# Patient Record
Sex: FEMALE | Race: WHITE | NOT HISPANIC OR LATINO | Employment: STUDENT | URBAN - METROPOLITAN AREA
[De-identification: names, ages, dates, MRNs, and addresses within clinical notes are randomized per-mention and may not be internally consistent; named-entity substitution may affect disease eponyms.]

---

## 2018-06-12 ENCOUNTER — OFFICE VISIT (OUTPATIENT)
Dept: FAMILY MEDICINE | Facility: CLINIC | Age: 23
End: 2018-06-12
Payer: COMMERCIAL

## 2018-06-12 ENCOUNTER — LAB VISIT (OUTPATIENT)
Dept: LAB | Facility: HOSPITAL | Age: 23
End: 2018-06-12
Attending: FAMILY MEDICINE
Payer: COMMERCIAL

## 2018-06-12 VITALS
DIASTOLIC BLOOD PRESSURE: 62 MMHG | WEIGHT: 111.56 LBS | BODY MASS INDEX: 19.77 KG/M2 | SYSTOLIC BLOOD PRESSURE: 98 MMHG | HEIGHT: 63 IN

## 2018-06-12 DIAGNOSIS — Z00.00 WELLNESS EXAMINATION: ICD-10-CM

## 2018-06-12 DIAGNOSIS — R51.9 PERSISTENT HEADACHES: ICD-10-CM

## 2018-06-12 DIAGNOSIS — Z01.00 EYE EXAM, ROUTINE: ICD-10-CM

## 2018-06-12 DIAGNOSIS — F32.A DEPRESSION, UNSPECIFIED DEPRESSION TYPE: ICD-10-CM

## 2018-06-12 DIAGNOSIS — Z00.00 WELLNESS EXAMINATION: Primary | ICD-10-CM

## 2018-06-12 LAB
ANION GAP SERPL CALC-SCNC: 9 MMOL/L
BASOPHILS # BLD AUTO: 0.03 K/UL
BASOPHILS NFR BLD: 0.4 %
BUN SERPL-MCNC: 11 MG/DL
CALCIUM SERPL-MCNC: 9.8 MG/DL
CHLORIDE SERPL-SCNC: 104 MMOL/L
CO2 SERPL-SCNC: 25 MMOL/L
CREAT SERPL-MCNC: 0.8 MG/DL
DIFFERENTIAL METHOD: ABNORMAL
EOSINOPHIL # BLD AUTO: 0.3 K/UL
EOSINOPHIL NFR BLD: 4.1 %
ERYTHROCYTE [DISTWIDTH] IN BLOOD BY AUTOMATED COUNT: 12.8 %
EST. GFR  (AFRICAN AMERICAN): >60 ML/MIN/1.73 M^2
EST. GFR  (NON AFRICAN AMERICAN): >60 ML/MIN/1.73 M^2
GLUCOSE SERPL-MCNC: 85 MG/DL
HCT VFR BLD AUTO: 43.2 %
HGB BLD-MCNC: 13.9 G/DL
IMM GRANULOCYTES # BLD AUTO: 0.01 K/UL
IMM GRANULOCYTES NFR BLD AUTO: 0.1 %
LYMPHOCYTES # BLD AUTO: 2.1 K/UL
LYMPHOCYTES NFR BLD: 28.5 %
MCH RBC QN AUTO: 32 PG
MCHC RBC AUTO-ENTMCNC: 32.2 G/DL
MCV RBC AUTO: 99 FL
MONOCYTES # BLD AUTO: 0.5 K/UL
MONOCYTES NFR BLD: 7.2 %
NEUTROPHILS # BLD AUTO: 4.3 K/UL
NEUTROPHILS NFR BLD: 59.7 %
NRBC BLD-RTO: 0 /100 WBC
PLATELET # BLD AUTO: 258 K/UL
PMV BLD AUTO: 10.5 FL
POTASSIUM SERPL-SCNC: 4 MMOL/L
RBC # BLD AUTO: 4.35 M/UL
SODIUM SERPL-SCNC: 138 MMOL/L
WBC # BLD AUTO: 7.26 K/UL

## 2018-06-12 PROCEDURE — 99395 PREV VISIT EST AGE 18-39: CPT | Mod: S$GLB,,, | Performed by: FAMILY MEDICINE

## 2018-06-12 PROCEDURE — 36415 COLL VENOUS BLD VENIPUNCTURE: CPT | Mod: PO

## 2018-06-12 PROCEDURE — 85025 COMPLETE CBC W/AUTO DIFF WBC: CPT

## 2018-06-12 PROCEDURE — 80048 BASIC METABOLIC PNL TOTAL CA: CPT

## 2018-06-12 PROCEDURE — 99999 PR PBB SHADOW E&M-EST. PATIENT-LVL III: CPT | Mod: PBBFAC,,, | Performed by: FAMILY MEDICINE

## 2018-06-12 RX ORDER — ESCITALOPRAM OXALATE 5 MG/1
5 TABLET ORAL DAILY
Qty: 30 TABLET | Refills: 11 | Status: SHIPPED | OUTPATIENT
Start: 2018-06-12 | End: 2018-09-19 | Stop reason: ALTCHOICE

## 2018-06-12 RX ORDER — BUTALBITAL, ACETAMINOPHEN AND CAFFEINE 50; 325; 40 MG/1; MG/1; MG/1
1 TABLET ORAL EVERY 4 HOURS PRN
Qty: 60 TABLET | Refills: 1 | Status: SHIPPED | OUTPATIENT
Start: 2018-06-12 | End: 2018-07-12
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
## 2018-06-15 ENCOUNTER — PATIENT MESSAGE (OUTPATIENT)
Dept: FAMILY MEDICINE | Facility: CLINIC | Age: 23
End: 2018-06-15

## 2018-06-15 ENCOUNTER — TELEPHONE (OUTPATIENT)
Dept: FAMILY MEDICINE | Facility: CLINIC | Age: 23
End: 2018-06-15

## 2018-06-15 NOTE — TELEPHONE ENCOUNTER
----- Message from Chucho Dickson MD sent at 6/14/2018 12:14 PM CDT -----  Please inform patient that lab results are not concerning/normal. No changes to medications at this time and if there are any questions, to give us a call. Thank You,    Chucho Dickson MD  Family Medicine  6/14/2018

## 2018-06-21 ENCOUNTER — CLINICAL SUPPORT (OUTPATIENT)
Dept: FAMILY MEDICINE | Facility: CLINIC | Age: 23
End: 2018-06-21
Payer: COMMERCIAL

## 2018-06-21 DIAGNOSIS — Z23 IMMUNIZATION DUE: Primary | ICD-10-CM

## 2018-06-21 PROCEDURE — 90471 IMMUNIZATION ADMIN: CPT | Mod: S$GLB,,, | Performed by: NURSE PRACTITIONER

## 2018-06-21 PROCEDURE — 90715 TDAP VACCINE 7 YRS/> IM: CPT | Mod: S$GLB,,, | Performed by: NURSE PRACTITIONER

## 2018-06-22 NOTE — PROGRESS NOTES
Pt. Identified using  and name.Procedure was explained, pt. Verbalized understanding. Tdap administered IM in the right deltoid using sterile technique, no bleeding noted at injection site.  Pt. tolerated procedure well.

## 2018-07-02 ENCOUNTER — TELEPHONE (OUTPATIENT)
Dept: FAMILY MEDICINE | Facility: CLINIC | Age: 23
End: 2018-07-02

## 2018-07-02 NOTE — TELEPHONE ENCOUNTER
Patient will need a form signed regarding her medication in order to start school. Do you need an appointment to sign document.    Please Advise:

## 2018-07-02 NOTE — TELEPHONE ENCOUNTER
What kind of form for medication?  School physical form that includes the medication on it? I have never heard of just a form for medications for schools?

## 2018-07-02 NOTE — TELEPHONE ENCOUNTER
----- Message from Leona Russell sent at 7/2/2018 12:17 PM CDT -----  Contact: PT  Type: Needs Medical Advice    Who Called:  Lisbeth   Symptoms (please be specific):  n/a  How long has patient had these symptoms:  n/a  Pharmacy name and phone #:  n/a  Best Call Back Number:    Additional Information:  Pt has a form from school that needs to be signed from Doctor to release her to start school and also stating that he is the prescriber for her prescription. Please call back and advise if she can come in to get this signed.

## 2018-07-31 ENCOUNTER — TELEPHONE (OUTPATIENT)
Dept: FAMILY MEDICINE | Facility: CLINIC | Age: 23
End: 2018-07-31

## 2018-07-31 NOTE — TELEPHONE ENCOUNTER
Patient informed letter is located at the . PCP out. Letter has been completed by PA. Pt. Verbalized understanding.

## 2018-07-31 NOTE — TELEPHONE ENCOUNTER
----- Message from Azalea Haney LPN sent at 7/30/2018  4:53 PM CDT -----  Contact: self [phone]      ----- Message -----  From: Josseline Giron  Sent: 7/30/2018  12:14 PM  To: [facility] Staff    Patient dropped off paperwork for [employer] regarding needing physician signature on it for medication she is on.  Please sign and call patient to advise when done.  Paperwork is in your mailbox.

## 2018-08-25 ENCOUNTER — PATIENT MESSAGE (OUTPATIENT)
Dept: FAMILY MEDICINE | Facility: CLINIC | Age: 23
End: 2018-08-25

## 2018-08-27 NOTE — TELEPHONE ENCOUNTER
Called patient. She stated that she is a mom of 2 little kids, was going back to school, and her  was out of stated working for another 7 months and with her crazy schedule she had forgotten to take her lexapro 3 days in a row and that's when she started feeling down, and wanting to sleep all day. She has resumed taking the lexapro and has withdrawn from school and she has been feeling much better but she feels like her lexapro needs to be increased. Scheduled appt with Randall tomorrow.

## 2018-09-18 PROBLEM — Z12.4 SCREENING FOR MALIGNANT NEOPLASM OF CERVIX: Status: ACTIVE | Noted: 2018-09-18

## 2018-09-18 PROBLEM — Z28.9 DELAYED IMMUNIZATIONS: Status: ACTIVE | Noted: 2018-09-18

## 2018-09-18 PROBLEM — Z11.8 ENCOUNTER FOR SCREENING EXAMINATION FOR CHLAMYDIAL INFECTION: Status: ACTIVE | Noted: 2018-09-18

## 2018-09-18 NOTE — PROGRESS NOTES
Subjective:       Patient ID: Lisbeth Vazquez is a 23 y.o. female.    Chief Complaint: Follow-up (mild depression with numb feeling emotions)    Pt is moving to [place] in 2 weeks. Pt d/c lexapro.   Blurred vision regardles of HA sx      Headache    This is a recurrent problem. The current episode started more than 1 year ago. The problem has been waxing and waning. The pain is located in the parietal region. The pain quality is similar to prior headaches. The quality of the pain is described as aching and pulsating. The pain is severe. Associated symptoms include blurred vision and tinnitus. Pertinent negatives include no hearing loss, neck pain, phonophobia, photophobia, rhinorrhea, vomiting or weakness. Exacerbated by: menstrual cycles, stress. Treatments tried: fioricet.     Review of Systems   Constitutional: Negative for activity change and unexpected weight change.   HENT: Positive for tinnitus. Negative for hearing loss, rhinorrhea and trouble swallowing.    Eyes: Positive for blurred vision. Negative for photophobia, discharge and visual disturbance.   Respiratory: Negative for chest tightness and wheezing.    Gastrointestinal: Negative for blood in stool, constipation, diarrhea and vomiting.   Endocrine: Negative for polydipsia and polyuria.   Genitourinary: Negative for difficulty urinating, dysuria, hematuria and menstrual problem.   Musculoskeletal: Negative for arthralgias, joint swelling and neck pain.   Neurological: Positive for headaches. Negative for weakness.   Psychiatric/Behavioral: Negative for dysphoric mood.       Objective:      Physical Exam   Constitutional: She appears well-developed and well-nourished. She appears distressed.   HENT:   Head: Normocephalic and atraumatic.   Eyes: Conjunctivae and EOM are normal. Pupils are equal, round, and reactive to light. Right eye exhibits no discharge. Left eye exhibits no discharge.   Pulmonary/Chest: Effort normal and breath sounds normal. No  respiratory distress. She has no wheezes.   Skin: Skin is warm and dry. No rash noted. She is not diaphoretic. No erythema.   Nursing note and vitals reviewed.      Assessment:       1. Depression, unspecified depression type    2. Screening for malignant neoplasm of cervix    3. Delayed immunizations    4. Encounter for screening examination for chlamydial infection    5. Other headache syndrome    6. Blurred vision        Plan:     Problem List Items Addressed This Visit        Neuro    Other headache syndrome    Current Assessment & Plan     Pt having occasional blurred vision -referring to opt to r/o any papilledema versus need for vision correction.  She also states that these HA coincide with her menstrual cycles.  Fioricet, ref opt.         Relevant Medications    butalbital-acetaminophen-caffeine -40 mg (FIORICET, ESGIC) -40 mg per tablet       Psychiatric    Depression - Primary    Current Assessment & Plan     Pt wishes to stop all ssri / anti-dep tx            Ophtho    Blurred vision    Relevant Orders    Ambulatory referral to Optometry       Renal/    Screening for malignant neoplasm of cervix       ID    Delayed immunizations    Relevant Orders    HPV Vaccine (9-Valent) (3 Dose) (IM) (Completed)    Encounter for screening examination for chlamydial infection    Relevant Orders    C. trachomatis/N. gonorrhoeae by AMP DNA [facility]; Urine

## 2018-09-19 ENCOUNTER — OFFICE VISIT (OUTPATIENT)
Dept: FAMILY MEDICINE | Facility: CLINIC | Age: 23
End: 2018-09-19
Payer: COMMERCIAL

## 2018-09-19 VITALS
WEIGHT: 114.44 LBS | RESPIRATION RATE: 16 BRPM | TEMPERATURE: 99 F | HEART RATE: 68 BPM | HEIGHT: 63 IN | DIASTOLIC BLOOD PRESSURE: 66 MMHG | OXYGEN SATURATION: 100 % | BODY MASS INDEX: 20.28 KG/M2 | SYSTOLIC BLOOD PRESSURE: 96 MMHG

## 2018-09-19 DIAGNOSIS — F32.A DEPRESSION, UNSPECIFIED DEPRESSION TYPE: Primary | ICD-10-CM

## 2018-09-19 DIAGNOSIS — H53.8 BLURRED VISION: ICD-10-CM

## 2018-09-19 DIAGNOSIS — G44.89 OTHER HEADACHE SYNDROME: ICD-10-CM

## 2018-09-19 DIAGNOSIS — Z28.9 DELAYED IMMUNIZATIONS: ICD-10-CM

## 2018-09-19 DIAGNOSIS — Z12.4 SCREENING FOR MALIGNANT NEOPLASM OF CERVIX: ICD-10-CM

## 2018-09-19 DIAGNOSIS — Z11.8 ENCOUNTER FOR SCREENING EXAMINATION FOR CHLAMYDIAL INFECTION: ICD-10-CM

## 2018-09-19 PROCEDURE — 3008F BODY MASS INDEX DOCD: CPT | Mod: CPTII,S$GLB,, | Performed by: FAMILY MEDICINE

## 2018-09-19 PROCEDURE — 90471 IMMUNIZATION ADMIN: CPT | Mod: S$GLB,,, | Performed by: FAMILY MEDICINE

## 2018-09-19 PROCEDURE — 87491 CHLMYD TRACH DNA AMP PROBE: CPT

## 2018-09-19 PROCEDURE — 99999 PR PBB SHADOW E&M-EST. PATIENT-LVL V: CPT | Mod: PBBFAC,,, | Performed by: FAMILY MEDICINE

## 2018-09-19 PROCEDURE — 90651 9VHPV VACCINE 2/3 DOSE IM: CPT | Mod: S$GLB,,, | Performed by: FAMILY MEDICINE

## 2018-09-19 PROCEDURE — 99214 OFFICE O/P EST MOD 30 MIN: CPT | Mod: 25,S$GLB,, | Performed by: FAMILY MEDICINE

## 2018-09-19 RX ORDER — BUTALBITAL, ACETAMINOPHEN AND CAFFEINE 50; 325; 40 MG/1; MG/1; MG/1
1 TABLET ORAL EVERY 4 HOURS PRN
Qty: 30 TABLET | Refills: 0 | Status: SHIPPED | OUTPATIENT
Start: 2018-09-19 | End: 2018-10-19

## 2018-09-19 NOTE — PROGRESS NOTES
Administered HPV IM. Patient tolerated well. No bleeding at insertion site noted. Pain scale 0/10 with injection. Two patient identifiers used (Name and ). Asceptic technique maintained.

## 2018-09-19 NOTE — ASSESSMENT & PLAN NOTE
Pt having occasional blurred vision -referring to opt to r/o any papilledema versus need for vision correction.  She also states that these HA coincide with her menstrual cycles.  Fioricet, ref opt.

## 2018-09-21 LAB
C TRACH DNA SPEC QL NAA+PROBE: NOT DETECTED
N GONORRHOEA DNA SPEC QL NAA+PROBE: NOT DETECTED